# Patient Record
Sex: MALE | URBAN - NONMETROPOLITAN AREA
[De-identification: names, ages, dates, MRNs, and addresses within clinical notes are randomized per-mention and may not be internally consistent; named-entity substitution may affect disease eponyms.]

---

## 2020-10-05 ENCOUNTER — OFFICE VISIT (OUTPATIENT)
Dept: URBAN - NONMETROPOLITAN AREA CLINIC 2 | Facility: CLINIC | Age: 55
End: 2020-10-05

## 2020-10-05 NOTE — HPI-TODAY'S VISIT:
The patient is here for abnormal liver tests. Patient denies any abnormal liver tests in the past. Patient denies any previous imaging of the liver. Patient denies any family history of liver disease. Patient's family is local. Patient denies any alcohol use. Patient denies IV drugs or tatoos. Patient denies any new medications or supplements. Patient denies any abdominal pain, swelling, mental changes, or increase in bleeding. CS